# Patient Record
Sex: FEMALE | Race: WHITE | HISPANIC OR LATINO | Employment: FULL TIME | URBAN - METROPOLITAN AREA
[De-identification: names, ages, dates, MRNs, and addresses within clinical notes are randomized per-mention and may not be internally consistent; named-entity substitution may affect disease eponyms.]

---
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
## 2022-03-17 ENCOUNTER — APPOINTMENT (OUTPATIENT)
Dept: RADIOLOGY | Facility: MEDICAL CENTER | Age: 22
End: 2022-03-17
Attending: EMERGENCY MEDICINE
Payer: COMMERCIAL

## 2022-03-17 ENCOUNTER — HOSPITAL ENCOUNTER (EMERGENCY)
Facility: MEDICAL CENTER | Age: 22
End: 2022-03-17
Attending: EMERGENCY MEDICINE
Payer: COMMERCIAL

## 2022-03-17 ENCOUNTER — OFFICE VISIT (OUTPATIENT)
Dept: MEDICAL GROUP | Facility: MEDICAL CENTER | Age: 22
End: 2022-03-17
Payer: COMMERCIAL

## 2022-03-17 ENCOUNTER — HOSPITAL ENCOUNTER (OUTPATIENT)
Dept: LAB | Facility: MEDICAL CENTER | Age: 22
End: 2022-03-17
Attending: FAMILY MEDICINE
Payer: COMMERCIAL

## 2022-03-17 VITALS
OXYGEN SATURATION: 97 % | WEIGHT: 161.82 LBS | HEIGHT: 62 IN | DIASTOLIC BLOOD PRESSURE: 74 MMHG | RESPIRATION RATE: 15 BRPM | SYSTOLIC BLOOD PRESSURE: 121 MMHG | TEMPERATURE: 99.1 F | BODY MASS INDEX: 29.78 KG/M2 | HEART RATE: 68 BPM

## 2022-03-17 VITALS
DIASTOLIC BLOOD PRESSURE: 62 MMHG | WEIGHT: 161.4 LBS | SYSTOLIC BLOOD PRESSURE: 112 MMHG | HEIGHT: 62 IN | BODY MASS INDEX: 29.7 KG/M2 | TEMPERATURE: 98.4 F | OXYGEN SATURATION: 100 % | HEART RATE: 98 BPM

## 2022-03-17 DIAGNOSIS — R10.9 ABDOMINAL CRAMPING: ICD-10-CM

## 2022-03-17 DIAGNOSIS — Z32.01 POSITIVE PREGNANCY TEST: ICD-10-CM

## 2022-03-17 DIAGNOSIS — Z3A.01 LESS THAN 8 WEEKS GESTATION OF PREGNANCY: ICD-10-CM

## 2022-03-17 LAB
ALBUMIN SERPL BCP-MCNC: 4.1 G/DL (ref 3.2–4.9)
ALBUMIN/GLOB SERPL: 1.6 G/DL
ALP SERPL-CCNC: 78 U/L (ref 30–99)
ALT SERPL-CCNC: 12 U/L (ref 2–50)
ANION GAP SERPL CALC-SCNC: 10 MMOL/L (ref 7–16)
APPEARANCE UR: CLEAR
AST SERPL-CCNC: 15 U/L (ref 12–45)
B-HCG SERPL-ACNC: 221 MIU/ML (ref 0–10)
BASOPHILS # BLD AUTO: 0.9 % (ref 0–1.8)
BASOPHILS # BLD: 0.05 K/UL (ref 0–0.12)
BILIRUB SERPL-MCNC: 0.5 MG/DL (ref 0.1–1.5)
BILIRUB UR QL STRIP.AUTO: NEGATIVE
BUN SERPL-MCNC: 9 MG/DL (ref 8–22)
CALCIUM SERPL-MCNC: 8.5 MG/DL (ref 8.4–10.2)
CHLORIDE SERPL-SCNC: 106 MMOL/L (ref 96–112)
CO2 SERPL-SCNC: 22 MMOL/L (ref 20–33)
COLOR UR: YELLOW
CREAT SERPL-MCNC: 0.5 MG/DL (ref 0.5–1.4)
EOSINOPHIL # BLD AUTO: 0.04 K/UL (ref 0–0.51)
EOSINOPHIL NFR BLD: 0.7 % (ref 0–6.9)
ERYTHROCYTE [DISTWIDTH] IN BLOOD BY AUTOMATED COUNT: 44.8 FL (ref 35.9–50)
GFR SERPLBLD CREATININE-BSD FMLA CKD-EPI: 138 ML/MIN/1.73 M 2
GLOBULIN SER CALC-MCNC: 2.6 G/DL (ref 1.9–3.5)
GLUCOSE SERPL-MCNC: 88 MG/DL (ref 65–99)
GLUCOSE UR STRIP.AUTO-MCNC: NEGATIVE MG/DL
HCG SERPL QL: POSITIVE
HCT VFR BLD AUTO: 40.1 % (ref 37–47)
HGB BLD-MCNC: 13.4 G/DL (ref 12–16)
IMM GRANULOCYTES # BLD AUTO: 0.01 K/UL (ref 0–0.11)
IMM GRANULOCYTES NFR BLD AUTO: 0.2 % (ref 0–0.9)
KETONES UR STRIP.AUTO-MCNC: NEGATIVE MG/DL
LEUKOCYTE ESTERASE UR QL STRIP.AUTO: NEGATIVE
LYMPHOCYTES # BLD AUTO: 2.26 K/UL (ref 1–4.8)
LYMPHOCYTES NFR BLD: 39.2 % (ref 22–41)
MCH RBC QN AUTO: 32.1 PG (ref 27–33)
MCHC RBC AUTO-ENTMCNC: 33.4 G/DL (ref 33.6–35)
MCV RBC AUTO: 95.9 FL (ref 81.4–97.8)
MICRO URNS: NORMAL
MONOCYTES # BLD AUTO: 0.53 K/UL (ref 0–0.85)
MONOCYTES NFR BLD AUTO: 9.2 % (ref 0–13.4)
NEUTROPHILS # BLD AUTO: 2.88 K/UL (ref 2–7.15)
NEUTROPHILS NFR BLD: 49.8 % (ref 44–72)
NITRITE UR QL STRIP.AUTO: NEGATIVE
NRBC # BLD AUTO: 0 K/UL
NRBC BLD-RTO: 0 /100 WBC
NUMBER OF RH DOSES IND 8505RD: NORMAL
PH UR STRIP.AUTO: 7.5 [PH] (ref 5–8)
PLATELET # BLD AUTO: 315 K/UL (ref 164–446)
PMV BLD AUTO: 10.2 FL (ref 9–12.9)
POTASSIUM SERPL-SCNC: 3.9 MMOL/L (ref 3.6–5.5)
PROT SERPL-MCNC: 6.7 G/DL (ref 6–8.2)
PROT UR QL STRIP: NEGATIVE MG/DL
RBC # BLD AUTO: 4.18 M/UL (ref 4.2–5.4)
RBC UR QL AUTO: NEGATIVE
RH BLD: NORMAL
SODIUM SERPL-SCNC: 138 MMOL/L (ref 135–145)
SP GR UR STRIP.AUTO: 1.02
WBC # BLD AUTO: 5.8 K/UL (ref 4.8–10.8)

## 2022-03-17 PROCEDURE — 76801 OB US < 14 WKS SINGLE FETUS: CPT

## 2022-03-17 PROCEDURE — 36415 COLL VENOUS BLD VENIPUNCTURE: CPT

## 2022-03-17 PROCEDURE — 99284 EMERGENCY DEPT VISIT MOD MDM: CPT

## 2022-03-17 PROCEDURE — 84703 CHORIONIC GONADOTROPIN ASSAY: CPT

## 2022-03-17 PROCEDURE — 85025 COMPLETE CBC W/AUTO DIFF WBC: CPT

## 2022-03-17 PROCEDURE — 99213 OFFICE O/P EST LOW 20 MIN: CPT | Performed by: FAMILY MEDICINE

## 2022-03-17 PROCEDURE — 86901 BLOOD TYPING SEROLOGIC RH(D): CPT

## 2022-03-17 PROCEDURE — 80053 COMPREHEN METABOLIC PANEL: CPT

## 2022-03-17 PROCEDURE — 84702 CHORIONIC GONADOTROPIN TEST: CPT

## 2022-03-17 PROCEDURE — 81003 URINALYSIS AUTO W/O SCOPE: CPT

## 2022-03-17 ASSESSMENT — ENCOUNTER SYMPTOMS
RESPIRATORY NEGATIVE: 1
ABDOMINAL PAIN: 1
MYALGIAS: 1
NAUSEA: 1
NEUROLOGICAL NEGATIVE: 1
CARDIOVASCULAR NEGATIVE: 1

## 2022-03-17 ASSESSMENT — FIBROSIS 4 INDEX
FIB4 SCORE: 0.25
FIB4 SCORE: 0.25

## 2022-03-17 NOTE — ASSESSMENT & PLAN NOTE
Acute   In office urine pregnancy was done and is negative   Due to the patient having four positive at home pregnancy test serum HCG was ordered  Transvaginal ultrasound to rule out potential pregnancy ordered   Will order a second HCG serum if first is positive   Patient advised to take prenatal vitamin as she may be pregnant  Educated patient on labs and imaging as well as the possibility of potential pregnancy.

## 2022-03-17 NOTE — ED TRIAGE NOTES
Pt comes in w/ boyfriend  Here to find out if she is truly pregnant  Has had multiple tests done some positives some negatives   She is lat on her period last one 1/27/2022  Had spotting and cramping 2/27/2022  Not feeling well  Weight gain  Was told by her MD to come to hospital for bld tests and US

## 2022-03-17 NOTE — ED PROVIDER NOTES
ED Provider Note    CHIEF COMPLAINT  Chief Complaint   Patient presents with   • Pregnancy     Unsure she is preg  wanting testing done   c/o missing period and spotting         HPI  Pati Lang is a 21 y.o. female who presents for evaluation of lower abdominal cramping which is mild.  Patient notes that she thinks her last menstrual period was on 27 February but the bleeding was very light, and she has taken for positive pregnancy test at home within the last week because she is having symptoms that she feels are related to pregnancy including constipation, occasional nausea, and sore breasts.  Patient notes that she is now a week late for her menses and is experiencing lower abdominal cramping.  She is not currently having any bleeding or spotting.    REVIEW OF SYSTEMS  Constitutional: No fevers or chills  Skin: No rashes  HEENT: No sore throat, runny nose  Chest: No pain   Pulm: No shortness of breath, or cough  Gastrointestinal: No vomiting, diarrhea, or abdominal pain.  Genitourinary: No dysuria or hematuria  Musculoskeletal: No pain, swelling, weakness  Heme: No bleeding or bruising problems.   Immuno: No hx of recurrent infections    PAST FAM HISTORY  No family history on file.    PAST MEDICAL HISTORY   has a past medical history of Breast cyst, right, Constipation, and UTI (urinary tract infection).    SOCIAL HISTORY  Social History     Tobacco Use   • Smoking status: Never Smoker   • Smokeless tobacco: Never Used   Vaping Use   • Vaping Use: Never used   Substance and Sexual Activity   • Alcohol use: Not Currently   • Drug use: Never   • Sexual activity: Yes     Partners: Male     Birth control/protection: Implant       SURGICAL HISTORY   has a past surgical history that includes other orthopedic surgery.    CURRENT MEDICATIONS  Home Medications     Reviewed by Marry Vick R.N. (Registered Nurse) on 03/17/22 at 1418  Med List Status: Partial   Medication Last Dose Status   ketoconazole  "(NIZORAL) 2 % Cream  Active   methylPREDNISolone (MEDROL DOSEPAK) 4 MG Tablet Therapy Pack  Active   triamcinolone acetonide (KENALOG) 0.1 % Cream  Active                ALLERGIES  Allergies   Allergen Reactions   • Ativan      HIVES     • Morphine Itching       PHYSICAL EXAM  VITAL SIGNS: /63   Pulse 65   Temp 37.6 °C (99.7 °F) (Temporal)   Resp 18   Ht 1.575 m (5' 2\")   Wt 73.4 kg (161 lb 13.1 oz)   LMP 01/27/2022   SpO2 100%   BMI 29.60 kg/m²    Gen: Alert in no apparent distress.  HEENT: No signs of trauma, Bilateral external ears normal, Nose normal. Conjunctiva normal, Non-icteric. .   Cardiovascular: Regular rate and rhythm, no murmurs.  Capillary refill less than 3 seconds to all extremities, 2+ distal pulses.  Thorax & Lungs: Normal breath sounds, No respiratory distress, No wheezing bilateral chest rise  Abdomen: Bowel sounds normal, Soft, No tenderness, No masses, No pulsatile masses. No Guarding or rebound  Skin: Warm, Dry  Extremities: Intact distal pulses, No edema  Neurologic: Alert , no facial droop, grossly normal coordination and strength  Psychiatric: Affect pleasant        LABS  Results for orders placed or performed during the hospital encounter of 03/17/22   HCG QUAL SERUM   Result Value Ref Range    Beta-Hcg Qualitative Serum Positive (A) Negative   CBC WITH DIFFERENTIAL   Result Value Ref Range    WBC 5.8 4.8 - 10.8 K/uL    RBC 4.18 (L) 4.20 - 5.40 M/uL    Hemoglobin 13.4 12.0 - 16.0 g/dL    Hematocrit 40.1 37.0 - 47.0 %    MCV 95.9 81.4 - 97.8 fL    MCH 32.1 27.0 - 33.0 pg    MCHC 33.4 (L) 33.6 - 35.0 g/dL    RDW 44.8 35.9 - 50.0 fL    Platelet Count 315 164 - 446 K/uL    MPV 10.2 9.0 - 12.9 fL    Neutrophils-Polys 49.80 44.00 - 72.00 %    Lymphocytes 39.20 22.00 - 41.00 %    Monocytes 9.20 0.00 - 13.40 %    Eosinophils 0.70 0.00 - 6.90 %    Basophils 0.90 0.00 - 1.80 %    Immature Granulocytes 0.20 0.00 - 0.90 %    Nucleated RBC 0.00 /100 WBC    Neutrophils (Absolute) 2.88 " 2.00 - 7.15 K/uL    Lymphs (Absolute) 2.26 1.00 - 4.80 K/uL    Monos (Absolute) 0.53 0.00 - 0.85 K/uL    Eos (Absolute) 0.04 0.00 - 0.51 K/uL    Baso (Absolute) 0.05 0.00 - 0.12 K/uL    Immature Granulocytes (abs) 0.01 0.00 - 0.11 K/uL    NRBC (Absolute) 0.00 K/uL   COMP METABOLIC PANEL   Result Value Ref Range    Sodium 138 135 - 145 mmol/L    Potassium 3.9 3.6 - 5.5 mmol/L    Chloride 106 96 - 112 mmol/L    Co2 22 20 - 33 mmol/L    Anion Gap 10.0 7.0 - 16.0    Glucose 88 65 - 99 mg/dL    Bun 9 8 - 22 mg/dL    Creatinine 0.50 0.50 - 1.40 mg/dL    Calcium 8.5 8.4 - 10.2 mg/dL    AST(SGOT) 15 12 - 45 U/L    ALT(SGPT) 12 2 - 50 U/L    Alkaline Phosphatase 78 30 - 99 U/L    Total Bilirubin 0.5 0.1 - 1.5 mg/dL    Albumin 4.1 3.2 - 4.9 g/dL    Total Protein 6.7 6.0 - 8.2 g/dL    Globulin 2.6 1.9 - 3.5 g/dL    A-G Ratio 1.6 g/dL   URINALYSIS (UA)    Specimen: Blood   Result Value Ref Range    Color Yellow     Character Clear     Specific Gravity 1.020 <1.035    Ph 7.5 5.0 - 8.0    Glucose Negative Negative mg/dL    Ketones Negative Negative mg/dL    Protein Negative Negative mg/dL    Bilirubin Negative Negative    Nitrite Negative Negative    Leukocyte Esterase Negative Negative    Occult Blood Negative Negative    Micro Urine Req see below    RH TYPE FOR RHOGAM FROM E.D.   Result Value Ref Range    Emergency Department Rh Typing POS     Number Of Rh Doses Indicated ZERO    HCG QUANTITATIVE SERUM   Result Value Ref Range    Bhcg 221.0 (H) 0.0 - 10.0 mIU/mL   ESTIMATED GFR   Result Value Ref Range    GFR (CKD-EPI) 138 >60 mL/min/1.73 m 2       RADIOLOGY  US-OB 1ST TRIMESTER WITH TRANSVAGINAL (COMBO)   Final Result      No intrauterine pregnancy seen. If the patient does have a positive pregnancy test, differential includes early pregnancy, complete , and ectopic pregnancy. Correlation with quantitative beta-hCG and follow-up ultrasound is recommended.               COURSE & MEDICAL DECISION MAKING  Patient arrives  for evaluation of possible pregnancy with her primary symptom and cramping.  It is possible she has late.  But she notes that she had 4+ pregnancy test at home and went and saw her primary care physician who noted that she had a negative pregnancy test.  She is upset about the ambiguity and wants to be tested again.    3:52 PM  Patient's serum qualitative pregnancy test is positive, she is stating she is having mild left lower quadrant/pelvic pain.  Given this, labs including ABO Rh will be drawn and an early pregnancy ultrasound will be undertaken to evaluate for possible ectopic pregnancy.    7 PM  Patient very likely has a very early pregnancy.  Given her symptoms today, I do not feel a pelvic exam will benefit the patient or .  She states clear understanding that the diagnosis is still somewhat in question as we did not know the trajectory of the quantitative beta-hCG.  It is well below the level of threshold for detection by ultrasound and she will need another repeat evaluation soon.  I do not feel this needs to happen in the 2-day window that is typical when concerned for an ectopic as he still will not be visible in 2 days based on this number.  I did feel follow-up next week was reasonable with the pregnancy center.  A referral was put in for the pregnancy center to see the patient in about a week as I feel this will give her adequate time to have a recognizable pregnancy by ultrasound.  She stated clear understanding that if symptoms worsen or change she would need to return for reevaluation    FINAL IMPRESSION  1. Less than 8 weeks gestation of pregnancy    2. Abdominal cramping        Electronically signed by: Phill Gonzales M.D., 3/17/2022 2:58 PM

## 2022-03-17 NOTE — ED NOTES
PIV placed, labs collected  Pt resting on gurney, pt in no acute distress, pt provided call light, instructed to call if needing any assistance, instructed not to get up by self, gurney in lowest position.

## 2022-03-17 NOTE — PROGRESS NOTES
"Pati Lang is a pleasant 21 y.o. female here for   Chief Complaint   Patient presents with   • Requesting Labs      HPI  Problem   Positive Pregnancy Test    Has taken several home pregnancy test at home which came back positive.  Reports body aches, vomiting, increase in thirst, bloating, and breast tenderness.  LMP on 01/27 to 1/31 and spotting 02/20 to 2/24.  Reporting left lower cramping, light.                 Current Medicines (including changes today)  No current outpatient medications on file.     No current facility-administered medications for this visit.     Past Medical/ Surgical History  She  has a past medical history of Breast cyst, right, Constipation, and UTI (urinary tract infection).  She  has a past surgical history that includes other orthopedic surgery.    Review of Systems   Respiratory: Negative.    Cardiovascular: Negative.    Gastrointestinal: Positive for abdominal pain and nausea.        Low pelvic cramping    Genitourinary: Negative.    Musculoskeletal: Positive for myalgias.        Complaints of low back pain    Neurological: Negative.          Objective:     /62 (BP Location: Left arm, Patient Position: Sitting, BP Cuff Size: Adult)   Pulse 98   Temp 36.9 °C (98.4 °F) (Temporal)   Ht 1.575 m (5' 2\")   Wt 73.2 kg (161 lb 6.4 oz)   SpO2 100%  Body mass index is 29.52 kg/m².    Physical Exam  Vitals reviewed.   Constitutional:       General: She is not in acute distress.     Appearance: Normal appearance.   HENT:      Head: Normocephalic and atraumatic.   Eyes:      Conjunctiva/sclera: Conjunctivae normal.      Pupils: Pupils are equal, round, and reactive to light.   Cardiovascular:      Rate and Rhythm: Regular rhythm.   Pulmonary:      Effort: Pulmonary effort is normal. No respiratory distress.      Breath sounds: Normal breath sounds.   Abdominal:      General: Bowel sounds are normal. There is no distension.   Musculoskeletal:      Cervical back: Normal range of " motion and neck supple.   Skin:     General: Skin is warm and dry.      Findings: No rash.   Neurological:      Mental Status: She is alert and oriented to person, place, and time.      Gait: Gait is intact.   Psychiatric:         Mood and Affect: Affect normal.          Imaging: No imaging at this time     Labs   03/17/2022 in office urine pregnancy negative   Assessment and Plan:   The following treatment plan was discussed    Problem List Items Addressed This Visit     Positive pregnancy test     Acute   In office urine pregnancy was done and is negative   Due to the patient having four positive at home pregnancy test serum HCG was ordered  Transvaginal ultrasound to rule out potential pregnancy ordered   Will order a second HCG serum if first is positive   Patient advised to take prenatal vitamin as she may be pregnant  Educated patient on labs and imaging as well as the possibility of potential pregnancy.         Relevant Orders    HCG QUANTITATIVE    US-OB 1ST TRIMESTER WITH TRANSVAGINAL (COMBO)           Followup: Return if symptoms worsen or fail to improve.    I have placed POCT pregnancy orders.  The MA is preforming poct pregnancy orders under the direction of Dr. Louis      Please note that this dictation was created using voice recognition software. I have made every reasonable attempt to correct obvious errors, but I expect that there are errors of grammar and possibly content that I did not discover before finalizing the note.

## 2022-04-04 ENCOUNTER — HOSPITAL ENCOUNTER (EMERGENCY)
Facility: MEDICAL CENTER | Age: 22
End: 2022-04-05
Attending: EMERGENCY MEDICINE
Payer: COMMERCIAL

## 2022-04-04 ENCOUNTER — APPOINTMENT (OUTPATIENT)
Dept: RADIOLOGY | Facility: MEDICAL CENTER | Age: 22
End: 2022-04-04
Attending: EMERGENCY MEDICINE
Payer: COMMERCIAL

## 2022-04-04 DIAGNOSIS — R10.9 FLANK PAIN: ICD-10-CM

## 2022-04-04 DIAGNOSIS — R10.32 LEFT LOWER QUADRANT ABDOMINAL PAIN: ICD-10-CM

## 2022-04-04 LAB
ALBUMIN SERPL BCP-MCNC: 4.4 G/DL (ref 3.2–4.9)
ALBUMIN/GLOB SERPL: 1.8 G/DL
ALP SERPL-CCNC: 80 U/L (ref 30–99)
ALT SERPL-CCNC: 32 U/L (ref 2–50)
ANION GAP SERPL CALC-SCNC: 13 MMOL/L (ref 7–16)
AST SERPL-CCNC: 29 U/L (ref 12–45)
B-HCG SERPL-ACNC: ABNORMAL MIU/ML (ref 0–5)
BASOPHILS # BLD AUTO: 0.6 % (ref 0–1.8)
BASOPHILS # BLD: 0.05 K/UL (ref 0–0.12)
BILIRUB SERPL-MCNC: <0.2 MG/DL (ref 0.1–1.5)
BUN SERPL-MCNC: 13 MG/DL (ref 8–22)
CALCIUM SERPL-MCNC: 8.7 MG/DL (ref 8.5–10.5)
CHLORIDE SERPL-SCNC: 104 MMOL/L (ref 96–112)
CO2 SERPL-SCNC: 20 MMOL/L (ref 20–33)
CREAT SERPL-MCNC: 0.48 MG/DL (ref 0.5–1.4)
EOSINOPHIL # BLD AUTO: 0.06 K/UL (ref 0–0.51)
EOSINOPHIL NFR BLD: 0.7 % (ref 0–6.9)
ERYTHROCYTE [DISTWIDTH] IN BLOOD BY AUTOMATED COUNT: 44.4 FL (ref 35.9–50)
GFR SERPLBLD CREATININE-BSD FMLA CKD-EPI: 139 ML/MIN/1.73 M 2
GLOBULIN SER CALC-MCNC: 2.5 G/DL (ref 1.9–3.5)
GLUCOSE SERPL-MCNC: 91 MG/DL (ref 65–99)
HCT VFR BLD AUTO: 39.4 % (ref 37–47)
HGB BLD-MCNC: 13.5 G/DL (ref 12–16)
IMM GRANULOCYTES # BLD AUTO: 0.01 K/UL (ref 0–0.11)
IMM GRANULOCYTES NFR BLD AUTO: 0.1 % (ref 0–0.9)
LIPASE SERPL-CCNC: 14 U/L (ref 11–82)
LYMPHOCYTES # BLD AUTO: 2.58 K/UL (ref 1–4.8)
LYMPHOCYTES NFR BLD: 31.6 % (ref 22–41)
MCH RBC QN AUTO: 32.3 PG (ref 27–33)
MCHC RBC AUTO-ENTMCNC: 34.3 G/DL (ref 33.6–35)
MCV RBC AUTO: 94.3 FL (ref 81.4–97.8)
MONOCYTES # BLD AUTO: 0.71 K/UL (ref 0–0.85)
MONOCYTES NFR BLD AUTO: 8.7 % (ref 0–13.4)
NEUTROPHILS # BLD AUTO: 4.76 K/UL (ref 2–7.15)
NEUTROPHILS NFR BLD: 58.3 % (ref 44–72)
NRBC # BLD AUTO: 0 K/UL
NRBC BLD-RTO: 0 /100 WBC
PLATELET # BLD AUTO: 279 K/UL (ref 164–446)
PMV BLD AUTO: 10 FL (ref 9–12.9)
POTASSIUM SERPL-SCNC: 3.7 MMOL/L (ref 3.6–5.5)
PROT SERPL-MCNC: 6.9 G/DL (ref 6–8.2)
RBC # BLD AUTO: 4.18 M/UL (ref 4.2–5.4)
SODIUM SERPL-SCNC: 137 MMOL/L (ref 135–145)
WBC # BLD AUTO: 8.2 K/UL (ref 4.8–10.8)

## 2022-04-04 PROCEDURE — 700102 HCHG RX REV CODE 250 W/ 637 OVERRIDE(OP): Performed by: EMERGENCY MEDICINE

## 2022-04-04 PROCEDURE — 36415 COLL VENOUS BLD VENIPUNCTURE: CPT

## 2022-04-04 PROCEDURE — 85025 COMPLETE CBC W/AUTO DIFF WBC: CPT

## 2022-04-04 PROCEDURE — 84702 CHORIONIC GONADOTROPIN TEST: CPT

## 2022-04-04 PROCEDURE — A9270 NON-COVERED ITEM OR SERVICE: HCPCS | Performed by: EMERGENCY MEDICINE

## 2022-04-04 PROCEDURE — 83690 ASSAY OF LIPASE: CPT

## 2022-04-04 PROCEDURE — 80053 COMPREHEN METABOLIC PANEL: CPT

## 2022-04-04 PROCEDURE — 99284 EMERGENCY DEPT VISIT MOD MDM: CPT

## 2022-04-04 PROCEDURE — 81003 URINALYSIS AUTO W/O SCOPE: CPT

## 2022-04-04 RX ORDER — ACETAMINOPHEN 325 MG/1
650 TABLET ORAL ONCE
Status: COMPLETED | OUTPATIENT
Start: 2022-04-04 | End: 2022-04-04

## 2022-04-04 RX ADMIN — ACETAMINOPHEN 650 MG: 325 TABLET, FILM COATED ORAL at 23:55

## 2022-04-04 ASSESSMENT — FIBROSIS 4 INDEX: FIB4 SCORE: 0.26

## 2022-04-05 ENCOUNTER — APPOINTMENT (OUTPATIENT)
Dept: RADIOLOGY | Facility: MEDICAL CENTER | Age: 22
End: 2022-04-05
Attending: EMERGENCY MEDICINE
Payer: COMMERCIAL

## 2022-04-05 VITALS
RESPIRATION RATE: 16 BRPM | DIASTOLIC BLOOD PRESSURE: 62 MMHG | HEIGHT: 62 IN | OXYGEN SATURATION: 99 % | SYSTOLIC BLOOD PRESSURE: 113 MMHG | BODY MASS INDEX: 30 KG/M2 | HEART RATE: 69 BPM | TEMPERATURE: 98 F | WEIGHT: 163 LBS

## 2022-04-05 LAB
APPEARANCE UR: CLEAR
BILIRUB UR QL STRIP.AUTO: NEGATIVE
COLOR UR: YELLOW
GLUCOSE UR STRIP.AUTO-MCNC: NEGATIVE MG/DL
KETONES UR STRIP.AUTO-MCNC: NEGATIVE MG/DL
LEUKOCYTE ESTERASE UR QL STRIP.AUTO: NEGATIVE
MICRO URNS: NORMAL
NITRITE UR QL STRIP.AUTO: NEGATIVE
PH UR STRIP.AUTO: 5 [PH] (ref 5–8)
PROT UR QL STRIP: NEGATIVE MG/DL
RBC UR QL AUTO: NEGATIVE
SP GR UR STRIP.AUTO: 1.03
UROBILINOGEN UR STRIP.AUTO-MCNC: 0.2 MG/DL

## 2022-04-05 PROCEDURE — 76775 US EXAM ABDO BACK WALL LIM: CPT

## 2022-04-05 PROCEDURE — 76801 OB US < 14 WKS SINGLE FETUS: CPT

## 2022-04-05 NOTE — ED PROVIDER NOTES
ED Provider Note    CHIEF COMPLAINT  Chief Complaint   Patient presents with   • Abdominal Pain     L side flank/lower pelvis, stabbing, 8/10, N/V, pain has been happening for 30min       HPI  Pati Lang is a G1, P0 21 y.o. female who presents for evaluation of pain which extends from the left part of her mid back all the way to her left lower quadrant/left groin region.  Patient notes the symptoms started this evening and has been gradually getting worse.  She thinks it might be related to pulling a very heavy door open at work with her left hand.  While the pain did not start at the exact same time, she notes no other symptoms or inciting events.  She notes no fevers, chills, dysuria, hematuria, or history of kidney stones.  She does note that she was recently diagnosed with a twin pregnancy and was dated that around 6 weeks last Thursday.  She has had no vaginal bleeding or discharge and no nausea, vomiting, diarrhea, or constipation.    REVIEW OF SYSTEMS  Constitutional: No fevers or chills  Skin: No rashes  HEENT: No sore throat, runny nose, sores, trouble swallowing, trouble speaking.  Neck: No neck pain, stiffness, or masses.  Chest: No pain or rashes  Pulm: No shortness of breath, cough, wheezing, stridor, or pain with inspiration/expiration  Gastrointestinal: No nausea, vomiting, diarrhea, constipation, bloating, melena, hematochezia Genitourinary: No dysuria or hematuria.  No vaginal bleeding or discharge  Musculoskeletal: No recent trauma, pain, swelling, or focal weakness  Heme: No bleeding or bruising problems.   Immuno: No hx of recurrent infections    PAST FAM HISTORY  History reviewed. No pertinent family history.    PAST MEDICAL HISTORY   has a past medical history of Breast cyst, right, Constipation, and UTI (urinary tract infection).    SOCIAL HISTORY  Social History     Tobacco Use   • Smoking status: Never Smoker   • Smokeless tobacco: Never Used   Vaping Use   • Vaping Use: Never used  "  Substance and Sexual Activity   • Alcohol use: Not Currently   • Drug use: Never   • Sexual activity: Yes     Partners: Male     Birth control/protection: Implant       SURGICAL HISTORY   has a past surgical history that includes other orthopedic surgery.    CURRENT MEDICATIONS  Home Medications     Reviewed by Madalyn Martinez R.N. (Registered Nurse) on 04/04/22 at 2152  Med List Status: Partial   Medication Last Dose Status        Patient [facility] Taking any Medications                       ALLERGIES  Allergies   Allergen Reactions   • Ativan      HIVES     • Morphine Itching       PHYSICAL EXAM  VITAL SIGNS: /62   Pulse 69   Temp 36.7 °C (98 °F) (Temporal)   Resp 16   Ht 1.575 m (5' 2\")   Wt 73.9 kg (163 lb)   LMP 02/20/2022   SpO2 99%   BMI 29.81 kg/m²    Gen: Alert, somewhat anxious, occasionally tearful  HEENT: No signs of trauma, Bilateral external ears normal, Nose normal. Conjunctiva normal, Non-icteric.   Cardiovascular: Regular rate and rhythm, no murmurs.  Capillary refill less than 3 seconds to all extremities, 2+ distal pulses.  Thorax & Lungs: Normal breath sounds, No respiratory distress, No wheezing bilateral chest rise  Abdomen: Bowel sounds normal, Soft, mild left lower quadrant and left flank tenderness, No masses, No pulsatile masses. No Guarding or rebound.  Patient has increase in pain in the back and flank with rotation of the torso.  Skin: Warm, Dry, No erythema, No rash noted to exposed areas.   Back: No bony tenderness, mild CVA tenderness  Extremities: Intact distal pulses, No edema  Neurologic: Alert , no facial droop, grossly normal coordination and strength    LABS  Results for orders placed or performed during the hospital encounter of 04/04/22   CBC WITH DIFFERENTIAL   Result Value Ref Range    WBC 8.2 4.8 - 10.8 K/uL    RBC 4.18 (L) 4.20 - 5.40 M/uL    Hemoglobin 13.5 12.0 - 16.0 g/dL    Hematocrit 39.4 37.0 - 47.0 %    MCV 94.3 81.4 - 97.8 fL    MCH 32.3 27.0 - 33.0 " pg    MCHC 34.3 33.6 - 35.0 g/dL    RDW 44.4 35.9 - 50.0 fL    Platelet Count 279 164 - 446 K/uL    MPV 10.0 9.0 - 12.9 fL    Neutrophils-Polys 58.30 44.00 - 72.00 %    Lymphocytes 31.60 22.00 - 41.00 %    Monocytes 8.70 0.00 - 13.40 %    Eosinophils 0.70 0.00 - 6.90 %    Basophils 0.60 0.00 - 1.80 %    Immature Granulocytes 0.10 0.00 - 0.90 %    Nucleated RBC 0.00 /100 WBC    Neutrophils (Absolute) 4.76 2.00 - 7.15 K/uL    Lymphs (Absolute) 2.58 1.00 - 4.80 K/uL    Monos (Absolute) 0.71 0.00 - 0.85 K/uL    Eos (Absolute) 0.06 0.00 - 0.51 K/uL    Baso (Absolute) 0.05 0.00 - 0.12 K/uL    Immature Granulocytes (abs) 0.01 0.00 - 0.11 K/uL    NRBC (Absolute) 0.00 K/uL   COMP METABOLIC PANEL   Result Value Ref Range    Sodium 137 135 - 145 mmol/L    Potassium 3.7 3.6 - 5.5 mmol/L    Chloride 104 96 - 112 mmol/L    Co2 20 20 - 33 mmol/L    Anion Gap 13.0 7.0 - 16.0    Glucose 91 65 - 99 mg/dL    Bun 13 8 - 22 mg/dL    Creatinine 0.48 (L) 0.50 - 1.40 mg/dL    Calcium 8.7 8.5 - 10.5 mg/dL    AST(SGOT) 29 12 - 45 U/L    ALT(SGPT) 32 2 - 50 U/L    Alkaline Phosphatase 80 30 - 99 U/L    Total Bilirubin <0.2 0.1 - 1.5 mg/dL    Albumin 4.4 3.2 - 4.9 g/dL    Total Protein 6.9 6.0 - 8.2 g/dL    Globulin 2.5 1.9 - 3.5 g/dL    A-G Ratio 1.8 g/dL   LIPASE   Result Value Ref Range    Lipase 14 11 - 82 U/L   HCG QUANTITATIVE   Result Value Ref Range    Bhcg 47751.0 (H) 0.0 - 5.0 mIU/mL   URINALYSIS,CULTURE IF INDICATED    Specimen: Urine   Result Value Ref Range    Color Yellow     Character Clear     Specific Gravity 1.030 <1.035    Ph 5.0 5.0 - 8.0    Glucose Negative Negative mg/dL    Ketones Negative Negative mg/dL    Protein Negative Negative mg/dL    Bilirubin Negative Negative    Urobilinogen, Urine 0.2 Negative    Nitrite Negative Negative    Leukocyte Esterase Negative Negative    Occult Blood Negative Negative    Micro Urine Req see below    ESTIMATED GFR   Result Value Ref Range    GFR (CKD-EPI) 139 >60 mL/min/1.73 m 2  "      RADIOLOGY  US-RENAL   Final Result         1.  Normal renal ultrasound.      US-OB 1ST TRIMESTER WITH TRANSVAGINAL (COMBO)   Final Result         1.  Single living intrauterine pregnancy at 6 weeks, 5 days estimated gestational age.   2.  Small subchorionic hemorrhage            COURSE & MEDICAL DECISION MAKING  Patient arrives for evaluation of rather abrupt onset of left back, flank, abdomen, and pelvic pain which she describes as a \"C\" shaped area of pain.  She notes she thinks this is related to opening a large steel door and possibly pulling a muscle.  She notes no dysuria or hematuria and no vaginal bleeding or discharge.  She does note that she was diagnosed with a twin pregnancy last Thursday and was felt to be around 6 weeks at that time.  Given the location of her pain I do feel getting imaging of her kidney and ureter is important as well as ensuring fetal wellbeing.  Patient is most concerned about the pregnancy itself and feels she does not want to be treated for the pain at this point.  She does not appear septic or toxic and has no fever to suggest pyelonephritis.    Patient's labs and imaging are generally reassuring and there is no direct evidence for any significant ureteral stone.  Interestingly, the patient appears to only have a single living intrauterine pregnancy at 6 weeks 5 days with a small subchorionic hemorrhage visible.  I discussed this with the patient and felt she should follow-up with the OB/GYN as it is unclear whether she actually has a twin pregnancy.  She is comfortable with the plan for follow-up and I do not feel it needs to happen emergently.  She states understanding return instructions.    FINAL IMPRESSION  1. Left lower quadrant abdominal pain    2. Flank pain        Electronically signed by: Phill Gonzales M.D., 4/4/2022 10:53 PM  "

## 2022-04-05 NOTE — ED NOTES
Pt discharged, all appropriate hospital equipment removed (IV, monitor, pulse ox, etc.). Pt left unit via wheelchair with partner to vehicle for home. Personal belongings with pt when leaving unit. Pt given discharge instructions prior to leaving unit including where to  prescriptions and when to follow-up; verbalizes understanding. Pt informed to return to ED if symptoms worsen/return or altered status develop. Copy of discharge instructions signed and turned into DC basket and copy sent with pt. Info regarding pregnancy pain and f/u with PCP and OB/GYN

## 2022-04-05 NOTE — ED TRIAGE NOTES
"Chief Complaint   Patient presents with   • Abdominal Pain     L side flank/lower pelvis, stabbing, 8/10, N/V, pain has been happening for 30min     Pt in wheelchair with above complaint. Pt states this is 1st pregnancy, has seen their OB/GYN with confirmed US, states x6 weeks along. Pt states ABD pain began after pushing open heavy door, pt states pain may be related to \"pulled muscle\".    Pt educated of triage process and informed to contact staff if situation changes.    /85   Pulse (!) 112   Temp 37.2 °C (98.9 °F) (Temporal)   Resp (!) 22   Ht 1.575 m (5' 2\")   Wt 73.9 kg (163 lb)   LMP 02/20/2022   SpO2 95%   BMI 29.81 kg/m²     "

## 2022-05-05 ENCOUNTER — OFFICE VISIT (OUTPATIENT)
Dept: MEDICAL GROUP | Facility: MEDICAL CENTER | Age: 22
End: 2022-05-05
Payer: COMMERCIAL

## 2022-05-05 VITALS
OXYGEN SATURATION: 100 % | SYSTOLIC BLOOD PRESSURE: 116 MMHG | WEIGHT: 169.31 LBS | BODY MASS INDEX: 31.16 KG/M2 | HEIGHT: 62 IN | HEART RATE: 89 BPM | TEMPERATURE: 97 F | DIASTOLIC BLOOD PRESSURE: 72 MMHG

## 2022-05-05 DIAGNOSIS — F32.A DEPRESSION, UNSPECIFIED DEPRESSION TYPE: ICD-10-CM

## 2022-05-05 DIAGNOSIS — R11.0 NAUSEA: ICD-10-CM

## 2022-05-05 DIAGNOSIS — Z34.91 FIRST TRIMESTER PREGNANCY: ICD-10-CM

## 2022-05-05 DIAGNOSIS — N64.9 BREAST LESION: ICD-10-CM

## 2022-05-05 DIAGNOSIS — E55.9 VITAMIN D INSUFFICIENCY: ICD-10-CM

## 2022-05-05 PROCEDURE — 99214 OFFICE O/P EST MOD 30 MIN: CPT | Performed by: FAMILY MEDICINE

## 2022-05-05 RX ORDER — METOCLOPRAMIDE 10 MG/1
TABLET ORAL
COMMUNITY
Start: 2022-04-01 | End: 2022-08-01

## 2022-05-05 RX ORDER — NITROFURANTOIN 25; 75 MG/1; MG/1
CAPSULE ORAL
COMMUNITY
Start: 2022-04-19 | End: 2022-05-05

## 2022-05-05 ASSESSMENT — ENCOUNTER SYMPTOMS
NAUSEA: 1
SHORTNESS OF BREATH: 0
WEAKNESS: 0
DEPRESSION: 1
NERVOUS/ANXIOUS: 1
FEVER: 0
DIZZINESS: 0
WEIGHT LOSS: 0
MYALGIAS: 0
ABDOMINAL PAIN: 0
VOMITING: 1
PALPITATIONS: 0
COUGH: 0
CHILLS: 0
CONSTIPATION: 1

## 2022-05-05 ASSESSMENT — PATIENT HEALTH QUESTIONNAIRE - PHQ9
CLINICAL INTERPRETATION OF PHQ2 SCORE: 1
SUM OF ALL RESPONSES TO PHQ QUESTIONS 1-9: 8
5. POOR APPETITE OR OVEREATING: 0 - NOT AT ALL

## 2022-05-05 ASSESSMENT — FIBROSIS 4 INDEX: FIB4 SCORE: 0.35

## 2022-05-05 NOTE — PATIENT INSTRUCTIONS
Get prenatal with DHA included, take at night.  Try prenatal One a day  Add Vitamin B6  [employer]  MD live

## 2022-05-06 NOTE — PROGRESS NOTES
Pati Lang is a pleasant 21 y.o. female here for   Chief Complaint   Patient presents with   • Lump     Lump / birthmark thing on left beast    • Depression      HPI:   Problem   First Trimester Pregnancy    Was seen by her OB, next appointment is May 14.  Has a lot of questions in regards to her vitamins and the overall feeling of nausea shortly after taking them.  Currently taking 3 prenatal vitamins and to fish oil vitamins at the same time.  Getting vitamin D supplementation in her daily vitamins as well as an additional 5000 units a day as she was told she had low vitamin D levels.  This will increase her nausea and overall feeling of unwell.  Starting to experience vivid dreams sometimes they are nightmares.  Concerned that she may not have an attachment to her baby after birth.  Will have episodes of feeling of depression and not wanting to get out of bed.  Partner does report improvement in her mood once he is able to get her out of the house.    Denies any suicidal ideation or self-harm.  Not recommended in getting initiated on any depression or anxiety medications.     Breast Lesion    Looks like a skin tag, but continues to grow and change color since she became pregnant.  Like to have it evaluated as she is concerned it may be something more significant.  Lesion located at 2 o'clock position from nipple     Nausea    Nausea during first trimester pregnancy, worsening with taking her vitamins.        Current Medicines (including changes today)  Current Outpatient Medications   Medication Sig Dispense Refill   • metoclopramide (REGLAN) 10 MG Tab        No current facility-administered medications for this visit.     Past Medical/ Surgical History  She  has a past medical history of Breast cyst, right, Constipation, and UTI (urinary tract infection).  She  has a past surgical history that includes other orthopedic surgery.    Review of Systems   Constitutional: Negative for chills, fever,  "malaise/fatigue and weight loss.   Respiratory: Negative for cough and shortness of breath.    Cardiovascular: Negative for chest pain and palpitations.   Gastrointestinal: Positive for constipation, nausea and vomiting. Negative for abdominal pain.   Genitourinary: Negative.    Musculoskeletal: Negative for myalgias.   Skin: Negative for rash.        Skin lesion to her left breast   Neurological: Negative for dizziness and weakness.   Psychiatric/Behavioral: Positive for depression. Negative for suicidal ideas. The patient is nervous/anxious.          Objective:     /72 (BP Location: Left arm, Patient Position: Sitting, BP Cuff Size: Adult)   Pulse 89   Temp 36.1 °C (97 °F) (Temporal)   Ht 1.575 m (5' 2\")   Wt 76.8 kg (169 lb 5 oz)   SpO2 100%  Body mass index is 30.97 kg/m².    Physical Exam  Constitutional:       General: She is not in acute distress.  HENT:      Head: Normocephalic and atraumatic.   Eyes:      Conjunctiva/sclera: Conjunctivae normal.      Pupils: Pupils are equal, round, and reactive to light.   Pulmonary:      Effort: Pulmonary effort is normal. No respiratory distress.   Abdominal:      General: There is no distension.   Musculoskeletal:      Cervical back: Normal range of motion and neck supple.   Skin:     General: Skin is warm and dry.      Findings: No rash.          Neurological:      Mental Status: She is alert and oriented to person, place, and time.      Gait: Gait is intact.   Psychiatric:         Mood and Affect: Affect normal.        Imaging:  No recent imaging to review    Labs  No recent labs to review  Assessment and Plan:   The following treatment plan was discussed    Problem List Items Addressed This Visit     First trimester pregnancy     Acute.  Recommended that the patient change her prenatal vitamins down to 1 a day with DHA and take it at night and attempt to avoid any nausea symptoms.  Recommended to add a vitamin B6 in the morning.  Advise follow-up with " OB.  Referral placed to psychology due to depressive symptoms.  We will recheck her vitamin D to ensure that she has not getting too much.         Breast lesion     Chronic, unstable.  Positive appearance of a skin tag, same discoloration as her areola.  Recommended referral to dermatology for possible removal.         Relevant Orders    Referral to Dermatology    Nausea     Acute.  Recommended taking her vitamins prior to bed and adding a B6 in the morning           Other Visit Diagnoses     Vitamin D insufficiency        Relevant Orders    VITAMIN D,25 HYDROXY    Depression, unspecified depression type        Relevant Orders    Referral to Psychology           Followup: Return in about 8 months (around 1/5/2023) for Postpartum follow-up.     My total time spent caring for the patient on the day of the encounter was 36 minutes.   This does not include time spent on separately billable procedures/tests.      Please note that this dictation was created using voice recognition software. I have made every reasonable attempt to correct obvious errors, but I expect that there are errors of grammar and possibly content that I did not discover before finalizing the note.

## 2022-06-02 ENCOUNTER — APPOINTMENT (RX ONLY)
Dept: URBAN - METROPOLITAN AREA CLINIC 6 | Facility: CLINIC | Age: 22
Setting detail: DERMATOLOGY
End: 2022-06-02

## 2022-06-02 DIAGNOSIS — L40.0 PSORIASIS VULGARIS: ICD-10-CM | Status: STABLE

## 2022-06-02 DIAGNOSIS — D22 MELANOCYTIC NEVI: ICD-10-CM

## 2022-06-02 PROBLEM — L30.9 DERMATITIS, UNSPECIFIED: Status: ACTIVE | Noted: 2022-06-02

## 2022-06-02 PROBLEM — D48.5 NEOPLASM OF UNCERTAIN BEHAVIOR OF SKIN: Status: ACTIVE | Noted: 2022-06-02

## 2022-06-02 PROCEDURE — ? COUNSELING

## 2022-06-02 PROCEDURE — 11102 TANGNTL BX SKIN SINGLE LES: CPT

## 2022-06-02 PROCEDURE — ? PRESCRIPTION MEDICATION MANAGEMENT

## 2022-06-02 PROCEDURE — ? BIOPSY BY SHAVE METHOD

## 2022-06-02 PROCEDURE — 99203 OFFICE O/P NEW LOW 30 MIN: CPT | Mod: 25

## 2022-06-02 ASSESSMENT — LOCATION SIMPLE DESCRIPTION DERM
LOCATION SIMPLE: CHEST
LOCATION SIMPLE: LEFT BREAST

## 2022-06-02 ASSESSMENT — LOCATION DETAILED DESCRIPTION DERM
LOCATION DETAILED: LEFT PERIAREOLAR BREAST 2-3:00 REGION
LOCATION DETAILED: LOWER STERNUM

## 2022-06-02 ASSESSMENT — LOCATION ZONE DERM: LOCATION ZONE: TRUNK

## 2022-06-02 NOTE — PROCEDURE: BIOPSY BY SHAVE METHOD
Detail Level: Detailed
Depth Of Biopsy: dermis
Was A Bandage Applied: Yes
Size Of Lesion In Cm: 0
Biopsy Type: H and E
Biopsy Method: Dermablade
Anesthesia Type: sterile water
Anesthesia Volume In Cc: 0.5
Hemostasis: Electrocautery
Wound Care: Petrolatum
Dressing: bandage
Destruction After The Procedure: No
Type Of Destruction Used: Curettage
Curettage Text: The wound bed was treated with curettage after the biopsy was performed.
Cryotherapy Text: The wound bed was treated with cryotherapy after the biopsy was performed.
Electrodesiccation Text: The wound bed was treated with electrodesiccation after the biopsy was performed.
Electrodesiccation And Curettage Text: The wound bed was treated with electrodesiccation and curettage after the biopsy was performed.
Silver Nitrate Text: The wound bed was treated with silver nitrate after the biopsy was performed.
Lab: [id number]
Lab Facility: 
Consent: Written consent was obtained and risks were reviewed including but not limited to scarring, infection, bleeding, scabbing, incomplete removal, nerve damage and allergy to anesthesia.
Post-Care Instructions: I reviewed with the patient in detail post-care instructions. Patient is to keep the biopsy site dry overnight, and then apply bacitracin twice daily until healed. Patient may apply hydrogen peroxide soaks to remove any crusting.
Notification Instructions: Patient will be notified of biopsy results. However, patient instructed to call the office if not contacted within 2 weeks.
Billing Type: Third-Party Bill
Information: Selecting Yes will display possible errors in your note based on the variables you have selected. This validation is only offered as a suggestion for you. PLEASE NOTE THAT THE VALIDATION TEXT WILL BE REMOVED WHEN YOU FINALIZE YOUR NOTE. IF YOU WANT TO FAX A PRELIMINARY NOTE YOU WILL NEED TO TOGGLE THIS TO 'NO' IF YOU DO NOT WANT IT IN YOUR FAXED NOTE.

## 2022-06-02 NOTE — PROCEDURE: PRESCRIPTION MEDICATION MANAGEMENT
Detail Level: Zone
Render In Strict Bullet Format?: No
Plan: Advised patient to avoid the use of topical steroids (has either hydrocortisone or triamcinolone at home) unless severe flare (pregnant) for short course.\\nRTC if uncontrolled

## 2022-07-24 ENCOUNTER — HOSPITAL ENCOUNTER (EMERGENCY)
Facility: MEDICAL CENTER | Age: 22
End: 2022-07-24
Attending: EMERGENCY MEDICINE
Payer: COMMERCIAL

## 2022-07-24 VITALS
BODY MASS INDEX: 33.06 KG/M2 | HEART RATE: 92 BPM | WEIGHT: 179.68 LBS | DIASTOLIC BLOOD PRESSURE: 57 MMHG | SYSTOLIC BLOOD PRESSURE: 102 MMHG | RESPIRATION RATE: 18 BRPM | HEIGHT: 62 IN | TEMPERATURE: 97.5 F | OXYGEN SATURATION: 100 %

## 2022-07-24 DIAGNOSIS — R11.0 NAUSEA: ICD-10-CM

## 2022-07-24 DIAGNOSIS — U07.1 COVID-19: ICD-10-CM

## 2022-07-24 LAB
ALBUMIN SERPL BCP-MCNC: 3.8 G/DL (ref 3.2–4.9)
ALBUMIN/GLOB SERPL: 1.3 G/DL
ALP SERPL-CCNC: 73 U/L (ref 30–99)
ALT SERPL-CCNC: 13 U/L (ref 2–50)
ANION GAP SERPL CALC-SCNC: 12 MMOL/L (ref 7–16)
APPEARANCE UR: CLEAR
AST SERPL-CCNC: 17 U/L (ref 12–45)
B-HCG SERPL-ACNC: 8875 MIU/ML (ref 0–10)
BASOPHILS # BLD AUTO: 0.5 % (ref 0–1.8)
BASOPHILS # BLD: 0.04 K/UL (ref 0–0.12)
BILIRUB SERPL-MCNC: 0.2 MG/DL (ref 0.1–1.5)
BILIRUB UR QL STRIP.AUTO: NEGATIVE
BUN SERPL-MCNC: 6 MG/DL (ref 8–22)
CALCIUM SERPL-MCNC: 8.5 MG/DL (ref 8.4–10.2)
CHLORIDE SERPL-SCNC: 102 MMOL/L (ref 96–112)
CO2 SERPL-SCNC: 20 MMOL/L (ref 20–33)
COLOR UR: YELLOW
CREAT SERPL-MCNC: 0.46 MG/DL (ref 0.5–1.4)
EOSINOPHIL # BLD AUTO: 0.01 K/UL (ref 0–0.51)
EOSINOPHIL NFR BLD: 0.1 % (ref 0–6.9)
ERYTHROCYTE [DISTWIDTH] IN BLOOD BY AUTOMATED COUNT: 44.8 FL (ref 35.9–50)
FLUAV RNA SPEC QL NAA+PROBE: NEGATIVE
FLUBV RNA SPEC QL NAA+PROBE: NEGATIVE
GFR SERPLBLD CREATININE-BSD FMLA CKD-EPI: 140 ML/MIN/1.73 M 2
GLOBULIN SER CALC-MCNC: 3 G/DL (ref 1.9–3.5)
GLUCOSE SERPL-MCNC: 81 MG/DL (ref 65–99)
GLUCOSE UR STRIP.AUTO-MCNC: NEGATIVE MG/DL
HCT VFR BLD AUTO: 36.5 % (ref 37–47)
HGB BLD-MCNC: 12.6 G/DL (ref 12–16)
IMM GRANULOCYTES # BLD AUTO: 0.03 K/UL (ref 0–0.11)
IMM GRANULOCYTES NFR BLD AUTO: 0.4 % (ref 0–0.9)
KETONES UR STRIP.AUTO-MCNC: NEGATIVE MG/DL
LEUKOCYTE ESTERASE UR QL STRIP.AUTO: NEGATIVE
LIPASE SERPL-CCNC: 9 U/L (ref 7–58)
LYMPHOCYTES # BLD AUTO: 0.92 K/UL (ref 1–4.8)
LYMPHOCYTES NFR BLD: 11.6 % (ref 22–41)
MCH RBC QN AUTO: 32.7 PG (ref 27–33)
MCHC RBC AUTO-ENTMCNC: 34.5 G/DL (ref 33.6–35)
MCV RBC AUTO: 94.8 FL (ref 81.4–97.8)
MICRO URNS: ABNORMAL
MONOCYTES # BLD AUTO: 0.86 K/UL (ref 0–0.85)
MONOCYTES NFR BLD AUTO: 10.9 % (ref 0–13.4)
NEUTROPHILS # BLD AUTO: 6.04 K/UL (ref 2–7.15)
NEUTROPHILS NFR BLD: 76.5 % (ref 44–72)
NITRITE UR QL STRIP.AUTO: NEGATIVE
NRBC # BLD AUTO: 0 K/UL
NRBC BLD-RTO: 0 /100 WBC
PH UR STRIP.AUTO: 8.5 [PH] (ref 5–8)
PLATELET # BLD AUTO: 242 K/UL (ref 164–446)
PMV BLD AUTO: 9.9 FL (ref 9–12.9)
POTASSIUM SERPL-SCNC: 3.7 MMOL/L (ref 3.6–5.5)
PROT SERPL-MCNC: 6.8 G/DL (ref 6–8.2)
PROT UR QL STRIP: NEGATIVE MG/DL
RBC # BLD AUTO: 3.85 M/UL (ref 4.2–5.4)
RBC UR QL AUTO: NEGATIVE
RSV RNA SPEC QL NAA+PROBE: NEGATIVE
SARS-COV-2 RNA RESP QL NAA+PROBE: DETECTED
SODIUM SERPL-SCNC: 134 MMOL/L (ref 135–145)
SP GR UR STRIP.AUTO: 1.01
SPECIMEN SOURCE: ABNORMAL
WBC # BLD AUTO: 7.9 K/UL (ref 4.8–10.8)

## 2022-07-24 PROCEDURE — 0241U HCHG SARS-COV-2 COVID-19 NFCT DS RESP RNA 4 TRGT MIC: CPT

## 2022-07-24 PROCEDURE — 85025 COMPLETE CBC W/AUTO DIFF WBC: CPT

## 2022-07-24 PROCEDURE — 99284 EMERGENCY DEPT VISIT MOD MDM: CPT

## 2022-07-24 PROCEDURE — C9803 HOPD COVID-19 SPEC COLLECT: HCPCS | Performed by: EMERGENCY MEDICINE

## 2022-07-24 PROCEDURE — 81003 URINALYSIS AUTO W/O SCOPE: CPT

## 2022-07-24 PROCEDURE — 36415 COLL VENOUS BLD VENIPUNCTURE: CPT

## 2022-07-24 PROCEDURE — 83690 ASSAY OF LIPASE: CPT

## 2022-07-24 PROCEDURE — 84702 CHORIONIC GONADOTROPIN TEST: CPT

## 2022-07-24 PROCEDURE — 700111 HCHG RX REV CODE 636 W/ 250 OVERRIDE (IP): Performed by: EMERGENCY MEDICINE

## 2022-07-24 PROCEDURE — 80053 COMPREHEN METABOLIC PANEL: CPT

## 2022-07-24 PROCEDURE — 96374 THER/PROPH/DIAG INJ IV PUSH: CPT

## 2022-07-24 RX ORDER — ONDANSETRON 2 MG/ML
4 INJECTION INTRAMUSCULAR; INTRAVENOUS ONCE
Status: COMPLETED | OUTPATIENT
Start: 2022-07-24 | End: 2022-07-24

## 2022-07-24 RX ORDER — ONDANSETRON 4 MG/1
4 TABLET, FILM COATED ORAL EVERY 4 HOURS PRN
Qty: 20 TABLET | Refills: 0 | Status: SHIPPED | OUTPATIENT
Start: 2022-07-24 | End: 2023-06-03

## 2022-07-24 RX ADMIN — ONDANSETRON 4 MG: 2 INJECTION INTRAMUSCULAR; INTRAVENOUS at 21:30

## 2022-07-24 ASSESSMENT — FIBROSIS 4 INDEX: FIB4 SCORE: 0.35

## 2022-07-25 NOTE — ED PROVIDER NOTES
ED Provider Note    CHIEF COMPLAINT  Chief Complaint   Patient presents with   • Cough     Both moist and dry  Onset Fri  Pt NOT vaccinated for Covid   • Headache     Pressure type   • N/V     x1   • Fatigue   • Generalized Body Aches   • Abdominal Pain     Low abd   • Back Pain   • Pregnancy     22 wks today No vaginal bleeding   • Constipation     X 1 wk       HPI  Pati Lang is a 21 y.o. female who presents with chief complaint of cough, nausea and vomiting, mild headaches and diffuse body aches.  Patient is G1, P0, currently 22 weeks pregnant.  She reports she is also had some hard stools but continues to pass stool and flatus.  Occasionally she will have some lower abdominal cramping that comes and goes.  Emesis is nonbloody nonbilious.  Patient has not vaccinated for COVID-19.   Patient denies any dysuria urgency or frequency.  Patient denies any associated associated vaginal bleeding.  [facility] for [facility].  She reports it has been uncomplicated.    REVIEW OF SYSTEMS  ROS    See HPI for further details. All other systems are negative.     PAST MEDICAL HISTORY   has a past medical history of Breast cyst, right, Constipation, and UTI (urinary tract infection).    SOCIAL HISTORY  Social History     Tobacco Use   • Smoking status: Never Smoker   • Smokeless tobacco: Never Used   Vaping Use   • Vaping Use: Never used   Substance and Sexual Activity   • Alcohol use: Not Currently   • Drug use: Never   • Sexual activity: Yes     Partners: Male     Birth control/protection: Implant       SURGICAL HISTORY   has a past surgical history that includes other orthopedic surgery.    CURRENT MEDICATIONS  Home Medications    **Home medications have not yet been reviewed for this encounter**         ALLERGIES  Allergies   Allergen Reactions   • Ativan      HIVES     • Morphine Itching       PHYSICAL EXAM  Vitals:    07/24/22 2042   BP: 126/77   Pulse: (!) 111   Resp: 16   Temp: 36.5  °C (97.7 °F)   SpO2: 96%       Physical Exam  Constitutional:       Appearance: She is well-developed.   HENT:      Head: Normocephalic and atraumatic.   Eyes:      Conjunctiva/sclera: Conjunctivae normal.   Cardiovascular:      Rate and Rhythm: Normal rate and regular rhythm.   Pulmonary:      Effort: Pulmonary effort is normal.      Breath sounds: Normal breath sounds.   Abdominal:      General: Bowel sounds are normal. There is no distension.      Palpations: Abdomen is soft.      Tenderness: There is no abdominal tenderness. There is no rebound.      Comments: Gravid uterus to just above umbilicus.  Minimal suprapubic tenderness.  No right lower or left lower quadrant tenderness.   Musculoskeletal:      Cervical back: Normal range of motion and neck supple.   Skin:     General: Skin is warm and dry.      Findings: No rash.   Neurological:      Mental Status: She is alert and oriented to person, place, and time.   Psychiatric:         Behavior: Behavior normal.           DIAGNOSTIC STUDIES / PROCEDURES      LABS  Results for orders placed or performed during the hospital encounter of 07/24/22   CoV-2, FLU A/B, and RSV by PCR (2-4 Hours [employer]) : Collect NP swab in VTM    Specimen: Nasopharyngeal; Respirate   Result Value Ref Range    SARS-CoV-2 Source NP Swab    CBC WITH DIFFERENTIAL   Result Value Ref Range    WBC 7.9 4.8 - 10.8 K/uL    RBC 3.85 (L) 4.20 - 5.40 M/uL    Hemoglobin 12.6 12.0 - 16.0 g/dL    Hematocrit 36.5 (L) 37.0 - 47.0 %    MCV 94.8 81.4 - 97.8 fL    MCH 32.7 27.0 - 33.0 pg    MCHC 34.5 33.6 - 35.0 g/dL    RDW 44.8 35.9 - 50.0 fL    Platelet Count 242 164 - 446 K/uL    MPV 9.9 9.0 - 12.9 fL    Neutrophils-Polys 76.50 (H) 44.00 - 72.00 %    Lymphocytes 11.60 (L) 22.00 - 41.00 %    Monocytes 10.90 0.00 - 13.40 %    Eosinophils 0.10 0.00 - 6.90 %    Basophils 0.50 0.00 - 1.80 %    Immature Granulocytes 0.40 0.00 - 0.90 %    Nucleated RBC 0.00 /100 WBC    Neutrophils (Absolute) 6.04 2.00 - 7.15  K/uL    Lymphs (Absolute) 0.92 (L) 1.00 - 4.80 K/uL    Monos (Absolute) 0.86 (H) 0.00 - 0.85 K/uL    Eos (Absolute) 0.01 0.00 - 0.51 K/uL    Baso (Absolute) 0.04 0.00 - 0.12 K/uL    Immature Granulocytes (abs) 0.03 0.00 - 0.11 K/uL    NRBC (Absolute) 0.00 K/uL   COMP METABOLIC PANEL   Result Value Ref Range    Sodium 134 (L) 135 - 145 mmol/L    Potassium 3.7 3.6 - 5.5 mmol/L    Chloride 102 96 - 112 mmol/L    Co2 20 20 - 33 mmol/L    Anion Gap 12.0 7.0 - 16.0    Glucose 81 65 - 99 mg/dL    Bun 6 (L) 8 - 22 mg/dL    Creatinine 0.46 (L) 0.50 - 1.40 mg/dL    Calcium 8.5 8.4 - 10.2 mg/dL    AST(SGOT) 17 12 - 45 U/L    ALT(SGPT) 13 2 - 50 U/L    Alkaline Phosphatase 73 30 - 99 U/L    Total Bilirubin 0.2 0.1 - 1.5 mg/dL    Albumin 3.8 3.2 - 4.9 g/dL    Total Protein 6.8 6.0 - 8.2 g/dL    Globulin 3.0 1.9 - 3.5 g/dL    A-G Ratio 1.3 g/dL   LIPASE   Result Value Ref Range    Lipase 9 7 - 58 U/L   HCG QUANTITATIVE   Result Value Ref Range    Bhcg 8875.0 (H) 0.0 - 10.0 mIU/mL   URINALYSIS,CULTURE IF INDICATED    Specimen: Urine, Clean Catch; Blood   Result Value Ref Range    Color Yellow     Character Clear     Specific Gravity 1.015 <1.035    Ph 8.5 (A) 5.0 - 8.0    Glucose Negative Negative mg/dL    Ketones Negative Negative mg/dL    Protein Negative Negative mg/dL    Bilirubin Negative Negative    Nitrite Negative Negative    Leukocyte Esterase Negative Negative    Occult Blood Negative Negative    Micro Urine Req see below    ESTIMATED GFR   Result Value Ref Range    GFR (CKD-EPI) 140 >60 mL/min/1.73 m 2           COURSE & MEDICAL DECISION MAKING  Pertinent Labs & Imaging studies reviewed. (See chart for details)    Patient here with 22-week pregnancy, she has symptoms that appear most consistent with upper respiratory infection.  She is well-appearing otherwise.  She is mildly tachycardic, but has normal oxygen saturation and respiratory rate.  Suspect COVID given the recent surge in our community.  Patient beta-hCG was  checked in triage, it is significantly low, unclear if this is from the natural decrease in this hormone versus a failure in pregnancy.  I have performed a bedside ultrasound.  Patient fetus has a reassuring heart rate and vigorous movement.  Patient is COVID-positive.  There are no available monoclonal antibodies.  Patient will be discharged home with supportive care, Zofran and instructions to take acetaminophen.  Return precautions discussed.     The patient will return for worsening symptoms and is stable at the time of discharge. The patient verbalizes understanding and will comply.    FINAL IMPRESSION    1. COVID-19    2. Nausea            Electronically signed by: Sushil Brennan M.D., 7/24/2022 9:09 PM

## 2022-07-25 NOTE — ED NOTES
Pt provided with discharge paper work and follow up care. Pt declines questions. Pt to ambulate out of Er without complications.

## 2022-07-25 NOTE — DISCHARGE INSTRUCTIONS
Your COVID test returned positive, call your gynecologist tomorrow.  Take Tylenol for fever and pain, take Zofran for nausea

## 2022-08-01 ENCOUNTER — OFFICE VISIT (OUTPATIENT)
Dept: MEDICAL GROUP | Facility: MEDICAL CENTER | Age: 22
End: 2022-08-01
Payer: COMMERCIAL

## 2022-08-01 VITALS
SYSTOLIC BLOOD PRESSURE: 118 MMHG | BODY MASS INDEX: 33.64 KG/M2 | HEIGHT: 62 IN | TEMPERATURE: 97.6 F | WEIGHT: 182.8 LBS | OXYGEN SATURATION: 96 % | HEART RATE: 94 BPM | DIASTOLIC BLOOD PRESSURE: 62 MMHG

## 2022-08-01 DIAGNOSIS — U07.1 COVID-19: ICD-10-CM

## 2022-08-01 DIAGNOSIS — Z34.90 PREGNANCY, UNSPECIFIED GESTATIONAL AGE: ICD-10-CM

## 2022-08-01 DIAGNOSIS — Z09 HOSPITAL DISCHARGE FOLLOW-UP: Primary | ICD-10-CM

## 2022-08-01 PROBLEM — Z32.01 POSITIVE PREGNANCY TEST: Status: RESOLVED | Noted: 2022-03-17 | Resolved: 2022-08-01

## 2022-08-01 PROCEDURE — 99213 OFFICE O/P EST LOW 20 MIN: CPT | Performed by: FAMILY MEDICINE

## 2022-08-01 RX ORDER — DOXYLAMINE SUCCINATE AND PYRIDOXINE HYDROCHLORIDE 20; 20 MG/1; MG/1
1 TABLET, EXTENDED RELEASE ORAL EVERY EVENING
Qty: 30 TABLET | Refills: 5 | Status: SHIPPED
Start: 2022-08-01 | End: 2023-06-03

## 2022-08-01 ASSESSMENT — ENCOUNTER SYMPTOMS
FEVER: 0
DEPRESSION: 0
CHILLS: 0
SHORTNESS OF BREATH: 0
VOMITING: 1
DIZZINESS: 0
NAUSEA: 1
INSOMNIA: 1
PALPITATIONS: 0
MYALGIAS: 0
WEAKNESS: 0
COUGH: 0
WEIGHT LOSS: 0
ABDOMINAL PAIN: 0

## 2022-08-01 ASSESSMENT — FIBROSIS 4 INDEX: FIB4 SCORE: 0.37

## 2022-08-01 NOTE — ASSESSMENT & PLAN NOTE
Acute.  Discussed the patient that any PCR COVID testing you complete will come back positive as she may come back positive for up to 4 weeks.  Recommended to continue drinking plenty of fluids and keep her appointment with her OB.

## 2022-08-01 NOTE — PROGRESS NOTES
Pati Lang is a pleasant 21 y.o. female here for   Chief Complaint   Patient presents with   • [facility]      HPI:   Problem   Covid-19    Diagnosed with COVID on 0724, seen in the ED.  Feels that her symptoms are improving.  Contacted her OB and reported to notify them if she was bleeding  States that she needs a negative COVID test in order to return to her work and to be seen by her OB.     Pregnancy    Reports continuation of her nausea as well as vomiting.  Difficult for her to keep her self hydrated with fluids.  Reports difficulty with sleep at night, feels that frequent urination may be a contributing cause.  Next appointment with her OB possibly next week.  Has tried Reglan, difficulty with taking this medication.  Has not tried vitamin B6 or Unisom.     Positive Pregnancy Test (Resolved)                  Current Medicines (including changes today)  Current Outpatient Medications   Medication Sig Dispense Refill   • Doxylamine-Pyridoxine ER (BONJESTA) 20-20 MG Tab CR Take 1 Tablet by mouth every evening. Can increase to 1 tab in am and 1 tab in evening if symptoms persist 30 Tablet 5   • ondansetron (ZOFRAN) 4 MG Tab tablet Take 1 Tablet by mouth every four hours as needed for Nausea/Vomiting. 20 Tablet 0     No current facility-administered medications for this visit.     Past Medical/ Surgical History  She  has a past medical history of Breast cyst, right, Constipation, and UTI (urinary tract infection).  She  has a past surgical history that includes other orthopedic surgery.    Review of Systems   Constitutional: Positive for malaise/fatigue. Negative for chills, fever and weight loss.   Respiratory: Negative for cough and shortness of breath.    Cardiovascular: Negative for chest pain and palpitations.   Gastrointestinal: Positive for nausea and vomiting. Negative for abdominal pain.   Genitourinary: Negative.    Musculoskeletal: Negative for myalgias.  "  Skin: Negative for rash.   Neurological: Negative for dizziness and weakness.   Psychiatric/Behavioral: Negative for depression. The patient has insomnia.          Objective:     /62 (BP Location: Left arm, Patient Position: Sitting, BP Cuff Size: Adult)   Pulse 94   Temp 36.4 °C (97.6 °F) (Temporal)   Ht 1.575 m (5' 2\")   Wt 82.9 kg (182 lb 12.8 oz)   SpO2 96%  Body mass index is 33.43 kg/m².    Physical Exam  Constitutional:       General: She is not in acute distress.  HENT:      Head: Normocephalic and atraumatic.   Eyes:      Conjunctiva/sclera: Conjunctivae normal.      Pupils: Pupils are equal, round, and reactive to light.   Pulmonary:      Effort: Pulmonary effort is normal. No respiratory distress.   Abdominal:      General: There is no distension.   Musculoskeletal:      Cervical back: Normal range of motion and neck supple.   Skin:     General: Skin is warm and dry.      Findings: No rash.   Neurological:      Mental Status: She is alert and oriented to person, place, and time.      Gait: Gait is intact.   Psychiatric:         Mood and Affect: Affect normal.        Imaging:  No recent imaging to review    Labs  Recent labs from 07/24/2022 reviewed with the patient.  Assessment and Plan:   The following treatment plan was discussed    Problem List Items Addressed This Visit     COVID-19     Acute.  Discussed the patient that any PCR COVID testing you complete will come back positive as she may come back positive for up to 4 weeks.  Recommended to continue drinking plenty of fluids and keep her appointment with her OB.           Pregnancy     Chronic, unstable.  Recommended following up with her OB to discuss her continuation of her nausea and vomiting.  Prescription for Bonjesta sent to her preferred pharmacy.  Recommended to print out coupons to get this medication more affordable.  Advised to drinking plenty of water throughout the day to help support pregnancy.           Relevant " Medications    Doxylamine-Pyridoxine ER (BONJESTA) 20-20 MG Tab CR      Other Visit Diagnoses     Hospital discharge follow-up    -  Primary           Followup: Return if symptoms worsen or fail to improve.      Please note that this dictation was created using voice recognition software. I have made every reasonable attempt to correct obvious errors, but I expect that there are errors of grammar and possibly content that I did not discover before finalizing the note.

## 2022-08-01 NOTE — LETTER
August 1, 2022    To Whom It May Concern:         This is confirmation that Pati Lang attended her scheduled appointment with JACOBY Self on 8/01/22.  She has met the CDC's guidelines for quarantine of COVID.  She was diagnosed with COVID on 07/24, per the CDC on 07/29 she met the 5-day quarantine requirement.  Does report improvement of her symptoms, she should be safe to return to work as long as she continues to wear a mask.         If you have any questions please do not hesitate to call me at the phone number listed below.    Sincerely,          PATRICE Self.  [phone]

## 2022-09-16 NOTE — ASSESSMENT & PLAN NOTE
Chronic, unstable.  Recommended following up with her OB to discuss her continuation of her nausea and vomiting.  Prescription for Bonjesta sent to her preferred pharmacy.  Recommended to print out coupons to get this medication more affordable.  Advised to drinking plenty of water throughout the day to help support pregnancy.   Detail Level: Zone

## 2022-12-15 ENCOUNTER — OFFICE VISIT (OUTPATIENT)
Dept: MEDICAL GROUP | Facility: MEDICAL CENTER | Age: 22
End: 2022-12-15
Payer: COMMERCIAL

## 2022-12-15 VITALS
HEART RATE: 70 BPM | RESPIRATION RATE: 16 BRPM | WEIGHT: 194 LBS | HEIGHT: 62 IN | TEMPERATURE: 97 F | DIASTOLIC BLOOD PRESSURE: 60 MMHG | BODY MASS INDEX: 35.7 KG/M2 | OXYGEN SATURATION: 97 % | SYSTOLIC BLOOD PRESSURE: 110 MMHG

## 2022-12-15 DIAGNOSIS — K80.20 GALLSTONES: ICD-10-CM

## 2022-12-15 DIAGNOSIS — G44.89 OTHER HEADACHE SYNDROME: ICD-10-CM

## 2022-12-15 DIAGNOSIS — N64.4 BREAST PAIN: ICD-10-CM

## 2022-12-15 PROCEDURE — 99213 OFFICE O/P EST LOW 20 MIN: CPT | Performed by: FAMILY MEDICINE

## 2022-12-15 ASSESSMENT — FIBROSIS 4 INDEX: FIB4 SCORE: 0.39

## 2022-12-15 NOTE — PROGRESS NOTES
Pati Lang is a pleasant 22 y.o. female here for   Chief Complaint   Patient presents with    Other     Burning in breat, breast feeding, milk supply decreased since burning X4 days    Gallstones      HPI:   Problem   Breast Pain    Bilateral breast pain started 4 days ago.  Unable to get baby to latch, following with lactation.  Burning to bilateral breast pain, Occurring all the time.  Is any red hot swollen breast  Right breast proucing more milk in the left, feels the pain more so in her right breast.  Cysts in the past, does feel large lumpy firmness occasionally to her breast.  Denies any difficulty with pumping, but unable to get baby to latch.  Denies any firmness to her breat     Other Headache Syndrome    Headaches, frontal x4 days.  Started when her breast discomfort started.  Does admit to having some congestion and runny nose, denies any sinus pressure.     Gallstones    She went into labor read [facility].  She had an ultrasound completed which showed gallstones.  Was recommended by her gynecologist to follow back up with her PCP regarding these.  Patient denies any abdominal discomfort, no pain associated with eating.          Current Medicines (including changes today)  Current Outpatient Medications   Medication Sig Dispense Refill    Doxylamine-Pyridoxine ER (BONJESTA) 20-20 MG Tab CR Take 1 Tablet by mouth every evening. Can increase to 1 tab in am and 1 tab in evening if symptoms persist (Patient not taking: Reported on 12/15/2022) 30 Tablet 5    ondansetron (ZOFRAN) 4 MG Tab tablet Take 1 Tablet by mouth every four hours as needed for Nausea/Vomiting. (Patient not taking: Reported on 12/15/2022) 20 Tablet 0     No current facility-administered medications for this visit.     Past Medical/ Surgical History  She  has a past medical history of Breast cyst, right, Constipation, and UTI (urinary tract infection).  She  has a past surgical history that includes other  "orthopedic surgery.     Objective:     /60 (BP Location: Right arm, Patient Position: Sitting, BP Cuff Size: Adult)   Pulse 70   Temp 36.1 °C (97 °F) (Temporal)   Resp 16   Ht 1.575 m (5' 2\")   Wt 88 kg (194 lb 0.1 oz)   SpO2 97%  Body mass index is 35.48 kg/m².    Physical Exam  Constitutional:       General: She is not in acute distress.  HENT:      Head: Normocephalic and atraumatic.   Eyes:      Conjunctiva/sclera: Conjunctivae normal.      Pupils: Pupils are equal, round, and reactive to light.   Pulmonary:      Effort: Pulmonary effort is normal. No respiratory distress.   Chest:   Breasts:     Right: Normal. No swelling, mass, skin change or tenderness.      Left: Normal. No swelling, mass, skin change or tenderness.   Abdominal:      General: There is no distension.   Musculoskeletal:      Cervical back: Normal range of motion and neck supple.   Skin:     General: Skin is warm and dry.      Findings: No rash.   Neurological:      Mental Status: She is alert and oriented to person, place, and time.      Gait: Gait is intact.   Psychiatric:         Mood and Affect: Affect normal.        Imaging:  No imaging    Labs  No updated labs  Assessment and Plan:   The following treatment plan was discussed     Problem List Items Addressed This Visit       Breast pain     Acute.  Reviewed breast anatomy with the patient that I suspect that the lump he intermittent for Mr. Breath may be associated with enlarged mamillary glands.  Recommended that she continue pumping to help alleviate those.  Recommended that she follow-up with the lactation consultant to help her with breast-feeding and discuss her breast burning.  Patient's breast bilaterally are soft, no erythema.  Silvia with the patient that I am unsure of the burning sensation that she is experiencing.  I did review let down and how this may cause some discomfort but that it should not be consistent only intermittently with pumping.    Okay to use " ibuprofen 600 mg every 6 hours as needed for any discomfort.  You can apply heat or ice, whichever provides more comfort.  Make sure that you are continuously pumping, avoid tight fitting clothing.  Patient provided with this information in her discharge summary.         Other headache syndrome     Acute.  Recommended ibuprofen and Tylenol to help with any discomfort that she is getting.  Make sure she is drinking plenty of fluids.  Unsure of exact etiology, may be associated with a viral infection.  If no improvement in her headaches after a week, recommended to reach back out to the clinic.         Gallstones     Diagnosis to the patient.  She is not currently having any active pain I do not feel referral [facility] to [facility] is needed at this time.  We will go ahead and obtain those ultrasound results to see her gallstones.  Provided with ED precautions, if any on start of severe abdominal discomfort that does not improve then I recommend she go seek treatment in the emergency room.           Information regarding her ultrasound requested, letter sent to [facility] to get copies of her imaging results.  Followup: Return if symptoms worsen or fail to improve.        Please note that this dictation was created using voice recognition software. I have made every reasonable attempt to correct obvious errors, but I expect that there are errors of grammar and possibly content that I did not discover before finalizing the note.

## 2022-12-15 NOTE — PATIENT INSTRUCTIONS
For pain relief, use cold compresses or soak a cloth in warm water and place it on your breast. A warm shower or bath may also help. Acetaminophen or ibuprofen can also be used for relief of pain and fever (aspirin should be avoided).  Rest and drink plenty of fluids.  If you are breastfeeding, continue to breastfeed. Start feeds with the sore breast first.  Express or pump milk from your breast between feeds.  Massage your breast to clear any blockages - stroke from the lumpy or sore area towards your nipple to help the milk flow.  Avoid tight-fitting clothes or bras.

## 2022-12-15 NOTE — ASSESSMENT & PLAN NOTE
Acute.  Recommended ibuprofen and Tylenol to help with any discomfort that she is getting.  Make sure she is drinking plenty of fluids.  Unsure of exact etiology, may be associated with a viral infection.  If no improvement in her headaches after a week, recommended to reach back out to the clinic.

## 2022-12-15 NOTE — LETTER
[facility]  PATRICE Self.  [address]  [place] [address]  Fax: [phone]   Authorization for Release/Disclosure of   Protected Health Information   Name: JOO LANG : 2000 SSN: [id number]   Address: [address] [place] [address]  [place] [address] Phone:    [phone] (home)    I authorize the entity listed below to release/disclose the PHI below to:   [facility]/JACOBY Self and JACOBY Self   Provider or Entity Name:  [facility]   Address   City, State, [facility]   Phone:      Fax:     Reason for request: continuity of care   Information to be released:    [  ] LAST COLONOSCOPY,  including any PATH REPORT and follow-up  [  ] LAST FIT/COLOGUARD RESULT [  ] LAST DEXA  [  ] LAST MAMMOGRAM  [  ] LAST PAP  [  ] LAST LABS [  ] RETINA EXAM REPORT  [  ] IMMUNIZATION RECORDS  [X  ] Release all info      [  ] Check here and initial the line next to each item to release ALL health information INCLUDING  _____ Care and treatment for drug and / or alcohol abuse  _____ HIV testing, infection status, or AIDS  _____ Genetic Testing    DATES OF SERVICE OR TIME PERIOD TO BE DISCLOSED: _____________  I understand and acknowledge that:  * This Authorization may be revoked at any time by you in writing, except if your health information has already been used or disclosed.  * Your health information that will be used or disclosed as a result of you signing this authorization could be re-disclosed by the recipient. If this occurs, your re-disclosed health information may no longer be protected by State or Federal laws.  * You may refuse to sign this Authorization. Your refusal will not affect your ability to obtain treatment.  * This Authorization becomes effective upon signing and will  on (date) __________.      If no date is indicated, this Authorization will  one (1) year from the signature date.    Name: Joo Lang    Signature:    Date:     12/15/2022       PLEASE FAX REQUESTED RECORDS BACK TO: [phone]

## 2022-12-15 NOTE — ASSESSMENT & PLAN NOTE
Diagnosis to the patient.  She is not currently having any active pain I do not feel referral over to [facility] is needed at this time.  We will go ahead and obtain those ultrasound results to see her gallstones.  Provided with ED precautions, if any on start of severe abdominal discomfort that does not improve then I recommend she go seek treatment in the emergency room.

## 2022-12-15 NOTE — ASSESSMENT & PLAN NOTE
Acute.  Reviewed breast anatomy with the patient that I suspect that the lump he intermittent for Mr. Breath may be associated with enlarged mamillary glands.  Recommended that she continue pumping to help alleviate those.  Recommended that she follow-up with the lactation consultant to help her with breast-feeding and discuss her breast burning.  Patient's breast bilaterally are soft, no erythema.  Silvia with the patient that I am unsure of the burning sensation that she is experiencing.  I did review let down and how this may cause some discomfort but that it should not be consistent only intermittently with pumping.    Okay to use ibuprofen 600 mg every 6 hours as needed for any discomfort.  You can apply heat or ice, whichever provides more comfort.  Make sure that you are continuously pumping, avoid tight fitting clothing.  Patient provided with this information in her discharge summary.

## 2023-06-03 ENCOUNTER — OFFICE VISIT (OUTPATIENT)
Dept: URGENT CARE | Facility: CLINIC | Age: 23
End: 2023-06-03
Payer: COMMERCIAL

## 2023-06-03 VITALS
HEART RATE: 88 BPM | TEMPERATURE: 97.8 F | SYSTOLIC BLOOD PRESSURE: 110 MMHG | OXYGEN SATURATION: 98 % | WEIGHT: 186 LBS | DIASTOLIC BLOOD PRESSURE: 70 MMHG | HEIGHT: 63 IN | BODY MASS INDEX: 32.96 KG/M2 | RESPIRATION RATE: 18 BRPM

## 2023-06-03 DIAGNOSIS — H92.03 OTALGIA OF BOTH EARS: ICD-10-CM

## 2023-06-03 DIAGNOSIS — H66.003 ACUTE SUPPURATIVE OTITIS MEDIA OF BOTH EARS WITHOUT SPONTANEOUS RUPTURE OF TYMPANIC MEMBRANES, RECURRENCE NOT SPECIFIED: Primary | ICD-10-CM

## 2023-06-03 PROCEDURE — 3074F SYST BP LT 130 MM HG: CPT | Performed by: FAMILY MEDICINE

## 2023-06-03 PROCEDURE — 3078F DIAST BP <80 MM HG: CPT | Performed by: FAMILY MEDICINE

## 2023-06-03 PROCEDURE — 99213 OFFICE O/P EST LOW 20 MIN: CPT | Performed by: FAMILY MEDICINE

## 2023-06-03 RX ORDER — AMOXICILLIN AND CLAVULANATE POTASSIUM 875; 125 MG/1; MG/1
1 TABLET, FILM COATED ORAL 2 TIMES DAILY
Qty: 14 TABLET | Refills: 0 | Status: SHIPPED | OUTPATIENT
Start: 2023-06-03 | End: 2023-06-10

## 2023-06-03 RX ORDER — NEOMYCIN POLYMYXIN B SULFATES AND DEXAMETHASONE 3.5; 10000; 1 MG/ML; [USP'U]/ML; MG/ML
SUSPENSION/ DROPS OPHTHALMIC
Qty: 5 ML | Refills: 0 | Status: SHIPPED | OUTPATIENT
Start: 2023-06-03

## 2023-06-03 RX ORDER — DEXAMETHASONE 6 MG/1
6 TABLET ORAL DAILY
Qty: 3 TABLET | Refills: 0 | Status: SHIPPED | OUTPATIENT
Start: 2023-06-03

## 2023-06-03 ASSESSMENT — ENCOUNTER SYMPTOMS: HEADACHES: 1

## 2023-06-03 ASSESSMENT — FIBROSIS 4 INDEX: FIB4 SCORE: 0.39

## 2023-06-03 NOTE — PROGRESS NOTES
"Subjective     Pati Lang is a 22 y.o. female who presents with Ear Pain (X 2 weeks, ear pain, draining, headache, pressure.)      - This is a pleasant and nontoxic appearing 22 y.o. female who has come to the walk-in clinic today for:    #1) ~2wks w/ ears feeling clogged and hurting and making sounds at times when driving over mountains. Sometimes feels some drainage. Has some headaches. No NVFC      ALLERGIES:  Lorazepam, Ativan, and Morphine     PMH:  Past Medical History:   Diagnosis Date    Breast cyst, right     Constipation     UTI (urinary tract infection)         PSH:  Past Surgical History:   Procedure Laterality Date    OTHER ORTHOPEDIC SURGERY      LLE       MEDS:    Current Outpatient Medications:     NON SPECIFIED, Birth control unknow name, Disp: , Rfl:     amoxicillin-clavulanate (AUGMENTIN) 875-125 MG Tab, Take 1 Tablet by mouth 2 times a day for 7 days., Disp: 14 Tablet, Rfl: 0    dexamethasone (DECADRON) 6 MG Tab, Take 1 Tablet by mouth every day., Disp: 3 Tablet, Rfl: 0    neomycin-polymyxin-dexamethasone (MAXITROL) 0.1 % ophthalmic suspension, 4 gtt in both ears TID x 5 days, Disp: 5 mL, Rfl: 0    ** I have documented what I find to be significant in regards to past medical, social, family and surgical history  in my HPI or under PMH/PSH/FH review section, otherwise it is noncontributory **         HPI    Review of Systems   HENT:  Positive for ear discharge and ear pain.    Neurological:  Positive for headaches.   All other systems reviewed and are negative.             Objective     /70   Pulse 88   Temp 36.6 °C (97.8 °F) (Temporal)   Resp 18   Ht 1.588 m (5' 2.5\")   Wt 84.4 kg (186 lb)   SpO2 98%   BMI 33.48 kg/m²      Physical Exam  Vitals and nursing note reviewed.   Constitutional:       General: She is not in acute distress.     Appearance: Normal appearance. She is well-developed.   HENT:      Head: Normocephalic.      Right Ear: External ear normal.      Left Ear: " External ear normal.      Ears:      Comments: Reports some tenderness of ear during otoscope exam, slight erythema canal. TM pink and bulging w/ posterior serous fluid      Mouth/Throat:      Mouth: Mucous membranes are moist.      Pharynx: Oropharynx is clear. No oropharyngeal exudate or posterior oropharyngeal erythema.   Cardiovascular:      Heart sounds: Normal heart sounds. No murmur heard.  Pulmonary:      Effort: Pulmonary effort is normal. No respiratory distress.   Neurological:      Mental Status: She is alert.      Motor: No abnormal muscle tone.   Psychiatric:         Mood and Affect: Mood normal.         Behavior: Behavior normal.             Assessment & Plan     1. Acute suppurative otitis media of both ears without spontaneous rupture of tympanic membranes, recurrence not specified  amoxicillin-clavulanate (AUGMENTIN) 875-125 MG Tab    dexamethasone (DECADRON) 6 MG Tab      2. Otalgia of both ears  dexamethasone (DECADRON) 6 MG Tab    neomycin-polymyxin-dexamethasone (MAXITROL) 0.1 % ophthalmic suspension          - Dx, plan & d/c instructions discussed   - OTC Flonase and Sudafed as needed      Follow up with your regular primary care providers office for a recheck on today's visit. ER if not improving in 2-3 days or if feeling/getting worse.     Any realistic side effects of medications that may have been given today reviewed.     Patient left in stable condition     Pertinent prior office visit notes in [employer] have been reviewed by me today on day of this visit.

## 2023-07-13 ENCOUNTER — OFFICE VISIT (OUTPATIENT)
Dept: URGENT CARE | Facility: CLINIC | Age: 23
End: 2023-07-13
Payer: COMMERCIAL

## 2023-07-13 ENCOUNTER — APPOINTMENT (OUTPATIENT)
Dept: RADIOLOGY | Facility: IMAGING CENTER | Age: 23
End: 2023-07-13
Attending: STUDENT IN AN ORGANIZED HEALTH CARE EDUCATION/TRAINING PROGRAM
Payer: COMMERCIAL

## 2023-07-13 VITALS
OXYGEN SATURATION: 98 % | SYSTOLIC BLOOD PRESSURE: 118 MMHG | TEMPERATURE: 97.9 F | HEART RATE: 97 BPM | DIASTOLIC BLOOD PRESSURE: 68 MMHG

## 2023-07-13 DIAGNOSIS — S66.819A STRAIN OF FLEXOR POLLICIS LONGUS TENDON: ICD-10-CM

## 2023-07-13 DIAGNOSIS — S67.01XA CRUSHING INJURY OF RIGHT THUMB, INITIAL ENCOUNTER: ICD-10-CM

## 2023-07-13 PROCEDURE — 3074F SYST BP LT 130 MM HG: CPT | Performed by: STUDENT IN AN ORGANIZED HEALTH CARE EDUCATION/TRAINING PROGRAM

## 2023-07-13 PROCEDURE — 73130 X-RAY EXAM OF HAND: CPT | Mod: TC,FY,RT | Performed by: STUDENT IN AN ORGANIZED HEALTH CARE EDUCATION/TRAINING PROGRAM

## 2023-07-13 PROCEDURE — 3078F DIAST BP <80 MM HG: CPT | Performed by: STUDENT IN AN ORGANIZED HEALTH CARE EDUCATION/TRAINING PROGRAM

## 2023-07-13 PROCEDURE — 99214 OFFICE O/P EST MOD 30 MIN: CPT | Performed by: STUDENT IN AN ORGANIZED HEALTH CARE EDUCATION/TRAINING PROGRAM

## 2023-07-13 ASSESSMENT — ENCOUNTER SYMPTOMS
TINGLING: 0
SENSORY CHANGE: 0
PAIN: 1

## 2023-07-13 NOTE — PROGRESS NOTES
Subjective     Pati Lang is a 22 y.o. female who presents with Pain (Patient presents for right thumb pain after smashing it in the car door.)            Pati is a 22 y.o. female who presents with right thumb pain after smashing right thumb in car door this morning.  Patient states that she went to catch door before closing and ended up slamming right thumb and car door.  The door was locked and so patient's initial instinct was to pull thumb out of close door.  Patient states that since the injury she has been experiencing pain, swelling and bruising to right thumb.  Patient has also had decreased range of motion.  Patient states she is iced and taken OTC NSAIDs at home which has not helped with symptoms.  Patient was told by co-workers to go to urgent care to have thumb evaluated which is why she came to urgent care today.        Review of Systems   Musculoskeletal:  Positive for joint pain (right thumb).   Neurological:  Negative for tingling and sensory change.   All other systems reviewed and are negative.             Objective     /68 (BP Location: Left arm, Patient Position: Sitting, BP Cuff Size: Adult)   Pulse 97   Temp 36.6 °C (97.9 °F) (Temporal)   SpO2 98%      Physical Exam  Vitals reviewed.   Constitutional:       Appearance: Normal appearance.   HENT:      Head: Normocephalic and atraumatic.      Nose: Nose normal.   Eyes:      Extraocular Movements: Extraocular movements intact.      Conjunctiva/sclera: Conjunctivae normal.      Pupils: Pupils are equal, round, and reactive to light.   Cardiovascular:      Rate and Rhythm: Normal rate.   Pulmonary:      Effort: Pulmonary effort is normal.   Musculoskeletal:      Right forearm: Normal.      Left forearm: Normal.      Right wrist: No swelling or snuff box tenderness. Normal range of motion. Normal pulse.      Left wrist: Normal.      Right hand: Swelling, tenderness and bony tenderness present. Decreased range of motion (flexion of  "right thumb). Normal capillary refill. Normal pulse.      Left hand: Normal.      Comments: Incomplete \"OK\" sign of right thumb.   Skin:     General: Skin is warm and dry.      Capillary Refill: Capillary refill takes less than 2 seconds.   Neurological:      General: No focal deficit present.      Mental Status: She is alert. Mental status is at baseline.                  RADIOLOGY RESULTS   DX-HAND 3+ RIGHT    Result Date: 7/13/2023 7/13/2023 3:36 PM HISTORY/REASON FOR EXAM:  Pain/Deformity Following Trauma. TECHNIQUE/EXAM DESCRIPTION AND NUMBER OF VIEWS:  3 views of the RIGHT hand. COMPARISON: 6/4/2021 wrist radiograph FINDINGS: There is no fracture or dislocation. The visualized osseous structures are in anatomic alignment. The joint spaces are preserved. Bone mineralization is age-appropriate..     No acute osseous abnormality.                   Assessment & Plan        1. Crushing injury of right thumb, initial encounter  - DX-HAND 3+ RIGHT IMPRESSION: No acute osseous abnormality.  - Referral to Sports Medicine  - Thumb SPICA    2. Strain of flexor pollicis longus tendon  - Incomplete \"OK\" sign.  - Unable to flex right thumb at DIP and MCP joint.  - Declined MRI and would prefer to treat supportively and follow-up with sports med for further evaluation/management if no improvement.  - Patient placed in thumb spica in clinic.    Differential diagnoses, supportive care measures (rest, elevation, ice, OTC NSAIDs, thumb spica/immobilization) and indications for immediate follow-up discussed with patient. Pathogenesis of diagnosis discussed including typical length and natural progression.  Follow up with PCP.  Referral placed for sports medicine if needed for further evaluation/management.    My total time spent caring for the patient on the day of the encounter was 30 minutes including obtaining patient history, physical exam, reviewing imaging results, discussing ddx, plan of care, supportive care measures, " addressing patient's questions/concerns and discussing appropriate follow-up and indications for immediate follow-up. This does not include time spent on separately billable procedures/tests.     Instructed to return to urgent care or nearest emergency department if symptoms fail to improve, for any change in condition, further concerns, or new concerning symptoms.    Patient states understanding and agrees with the plan of care and discharge instructions.

## 2023-07-21 ENCOUNTER — TELEPHONE (OUTPATIENT)
Dept: HEALTH INFORMATION MANAGEMENT | Facility: OTHER | Age: 23
End: 2023-07-21

## 2024-04-09 NOTE — ASSESSMENT & PLAN NOTE
Acute.  Recommended taking her vitamins prior to bed and adding a B6 in the morning  
Acute.  Recommended that the patient change her prenatal vitamins down to 1 a day with DHA and take it at night and attempt to avoid any nausea symptoms.  Recommended to add a vitamin B6 in the morning.  Advise follow-up with OB.  Referral placed to psychology due to depressive symptoms.  We will recheck her vitamin D to ensure that she has not getting too much.  
Chronic, unstable.  Positive appearance of a skin tag, same discoloration as her areola.  Recommended referral to dermatology for possible removal.  
English

## 2025-04-18 ENCOUNTER — HOSPITAL ENCOUNTER (OUTPATIENT)
Facility: MEDICAL CENTER | Age: 25
End: 2025-04-18
Attending: UROLOGY
Payer: COMMERCIAL

## 2025-04-18 PROCEDURE — 87186 SC STD MICRODIL/AGAR DIL: CPT

## 2025-04-18 PROCEDURE — 87086 URINE CULTURE/COLONY COUNT: CPT

## 2025-04-18 PROCEDURE — 87077 CULTURE AEROBIC IDENTIFY: CPT
